# Patient Record
Sex: FEMALE | Race: BLACK OR AFRICAN AMERICAN | NOT HISPANIC OR LATINO
[De-identification: names, ages, dates, MRNs, and addresses within clinical notes are randomized per-mention and may not be internally consistent; named-entity substitution may affect disease eponyms.]

---

## 2018-07-30 PROBLEM — Z00.00 ENCOUNTER FOR PREVENTIVE HEALTH EXAMINATION: Status: ACTIVE | Noted: 2018-07-30

## 2018-08-14 ENCOUNTER — APPOINTMENT (OUTPATIENT)
Dept: ORTHOPEDIC SURGERY | Facility: CLINIC | Age: 68
End: 2018-08-14
Payer: MEDICARE

## 2018-08-14 VITALS — WEIGHT: 190 LBS | BODY MASS INDEX: 27.2 KG/M2 | HEIGHT: 70 IN

## 2018-08-14 PROCEDURE — 99214 OFFICE O/P EST MOD 30 MIN: CPT

## 2018-08-14 PROCEDURE — 73502 X-RAY EXAM HIP UNI 2-3 VIEWS: CPT

## 2019-03-05 ENCOUNTER — APPOINTMENT (OUTPATIENT)
Dept: ORTHOPEDIC SURGERY | Facility: CLINIC | Age: 69
End: 2019-03-05
Payer: MEDICARE

## 2019-03-05 VITALS
WEIGHT: 209 LBS | HEIGHT: 70 IN | SYSTOLIC BLOOD PRESSURE: 133 MMHG | BODY MASS INDEX: 29.92 KG/M2 | HEART RATE: 71 BPM | DIASTOLIC BLOOD PRESSURE: 85 MMHG

## 2019-03-05 DIAGNOSIS — M16.0 BILATERAL PRIMARY OSTEOARTHRITIS OF HIP: ICD-10-CM

## 2019-03-05 DIAGNOSIS — M25.552 PAIN IN LEFT HIP: ICD-10-CM

## 2019-03-05 PROCEDURE — 99215 OFFICE O/P EST HI 40 MIN: CPT

## 2019-03-05 PROCEDURE — 73521 X-RAY EXAM HIPS BI 2 VIEWS: CPT

## 2019-05-13 ENCOUNTER — OUTPATIENT (OUTPATIENT)
Dept: OUTPATIENT SERVICES | Facility: HOSPITAL | Age: 69
LOS: 1 days | End: 2019-05-13
Payer: MEDICARE

## 2019-05-13 VITALS
WEIGHT: 210.98 LBS | OXYGEN SATURATION: 98 % | RESPIRATION RATE: 16 BRPM | SYSTOLIC BLOOD PRESSURE: 112 MMHG | HEIGHT: 70 IN | TEMPERATURE: 99 F | DIASTOLIC BLOOD PRESSURE: 79 MMHG | HEART RATE: 80 BPM

## 2019-05-13 DIAGNOSIS — E03.9 HYPOTHYROIDISM, UNSPECIFIED: ICD-10-CM

## 2019-05-13 DIAGNOSIS — M19.90 UNSPECIFIED OSTEOARTHRITIS, UNSPECIFIED SITE: ICD-10-CM

## 2019-05-13 DIAGNOSIS — Z01.818 ENCOUNTER FOR OTHER PREPROCEDURAL EXAMINATION: ICD-10-CM

## 2019-05-13 DIAGNOSIS — Z29.9 ENCOUNTER FOR PROPHYLACTIC MEASURES, UNSPECIFIED: ICD-10-CM

## 2019-05-13 DIAGNOSIS — I10 ESSENTIAL (PRIMARY) HYPERTENSION: ICD-10-CM

## 2019-05-13 DIAGNOSIS — Z91.040 LATEX ALLERGY STATUS: ICD-10-CM

## 2019-05-13 DIAGNOSIS — M16.12 UNILATERAL PRIMARY OSTEOARTHRITIS, LEFT HIP: ICD-10-CM

## 2019-05-13 LAB
BLD GP AB SCN SERPL QL: POSITIVE — SIGNIFICANT CHANGE UP
HCT VFR BLD CALC: 42.6 % — SIGNIFICANT CHANGE UP (ref 34.5–45)
HGB BLD-MCNC: 13.5 G/DL — SIGNIFICANT CHANGE UP (ref 11.5–15.5)
MCHC RBC-ENTMCNC: 26.8 PG — LOW (ref 27–34)
MCHC RBC-ENTMCNC: 31.7 GM/DL — LOW (ref 32–36)
MCV RBC AUTO: 84.7 FL — SIGNIFICANT CHANGE UP (ref 80–100)
PLATELET # BLD AUTO: 183 K/UL — SIGNIFICANT CHANGE UP (ref 150–400)
RBC # BLD: 5.03 M/UL — SIGNIFICANT CHANGE UP (ref 3.8–5.2)
RBC # FLD: 14.8 % — HIGH (ref 10.3–14.5)
RH IG SCN BLD-IMP: POSITIVE — SIGNIFICANT CHANGE UP
WBC # BLD: 4.71 K/UL — SIGNIFICANT CHANGE UP (ref 3.8–10.5)
WBC # FLD AUTO: 4.71 K/UL — SIGNIFICANT CHANGE UP (ref 3.8–10.5)

## 2019-05-13 PROCEDURE — 87640 STAPH A DNA AMP PROBE: CPT

## 2019-05-13 PROCEDURE — 86850 RBC ANTIBODY SCREEN: CPT

## 2019-05-13 PROCEDURE — 86923 COMPATIBILITY TEST ELECTRIC: CPT

## 2019-05-13 PROCEDURE — 87641 MR-STAPH DNA AMP PROBE: CPT

## 2019-05-13 PROCEDURE — 85027 COMPLETE CBC AUTOMATED: CPT

## 2019-05-13 PROCEDURE — 86900 BLOOD TYPING SEROLOGIC ABO: CPT

## 2019-05-13 PROCEDURE — 86901 BLOOD TYPING SEROLOGIC RH(D): CPT

## 2019-05-13 PROCEDURE — 86905 BLOOD TYPING RBC ANTIGENS: CPT

## 2019-05-13 PROCEDURE — 86880 COOMBS TEST DIRECT: CPT

## 2019-05-13 PROCEDURE — 86870 RBC ANTIBODY IDENTIFICATION: CPT

## 2019-05-13 PROCEDURE — G0463: CPT

## 2019-05-13 RX ORDER — LIDOCAINE HCL 20 MG/ML
0.2 VIAL (ML) INJECTION ONCE
Refills: 0 | Status: DISCONTINUED | OUTPATIENT
Start: 2019-06-03 | End: 2019-06-03

## 2019-05-13 RX ORDER — VANCOMYCIN HCL 1 G
1500 VIAL (EA) INTRAVENOUS ONCE
Refills: 0 | Status: DISCONTINUED | OUTPATIENT
Start: 2019-06-03 | End: 2019-06-05

## 2019-05-13 RX ORDER — CHLORHEXIDINE GLUCONATE 213 G/1000ML
1 SOLUTION TOPICAL ONCE
Refills: 0 | Status: DISCONTINUED | OUTPATIENT
Start: 2019-06-03 | End: 2019-06-03

## 2019-05-13 RX ORDER — SODIUM CHLORIDE 9 MG/ML
3 INJECTION INTRAMUSCULAR; INTRAVENOUS; SUBCUTANEOUS EVERY 8 HOURS
Refills: 0 | Status: DISCONTINUED | OUTPATIENT
Start: 2019-06-03 | End: 2019-06-03

## 2019-05-13 NOTE — H&P PST ADULT - NS MD HP PULSE RADIAL
For information on Fall & Injury Prevention, visit [email or web address]
right normal/left normal

## 2019-05-13 NOTE — H&P PST ADULT - GASTROINTESTINAL DETAILS
no masses palpable/no rebound tenderness/soft/no guarding/normal/bowel sounds normal/no rigidity/no bruit/nontender/no distention/no organomegaly normal/nontender/no distention/soft/no masses palpable/bowel sounds normal/no guarding/no bruit/no rebound tenderness/no rigidity

## 2019-05-13 NOTE — H&P PST ADULT - NSICDXPASTMEDICALHX_GEN_ALL_CORE_FT
PAST MEDICAL HISTORY:  Arthritis     Hallux Rigidus     HLD (Hyperlipidemia)     HTN - Hypertension     Hypothyroidism

## 2019-05-13 NOTE — H&P PST ADULT - NSANTHOSAYNRD_GEN_A_CORE
No. LAVELL screening performed.  STOP BANG Legend: 0-2 = LOW Risk; 3-4 = INTERMEDIATE Risk; 5-8 = HIGH Risk

## 2019-05-13 NOTE — H&P PST ADULT - HISTORY OF PRESENT ILLNESS
70 yo female, PMH HTN, HLD, OA of left hip with pain for many years, pt. presents to [facility] today for scheduled Left anterior Total Hip Replacement on 6/3/19. Pt. denies recent fever, chills, chest pain, SOB, changes in bowel/urinary habits.  Lab work done at PCP's office one week ago - they will be faxing to [facility] today  T&S, MRSA/MSSA done at [facility] 70 yo female, PMH HTN, HLD, OA of left hip with pain for many years, preventing her to stay active, presently using cane to ambulate. Pt. presents to [facility] today for scheduled Left anterior Total Hip Replacement on 6/3/19. Pt. denies recent fever, chills, chest pain, SOB, changes in bowel/urinary habits.  CBC, T&S, MRSA/MSSA done at [facility], BMP done at PCP - wnl, HgA1c 5.9 on 5/4/19

## 2019-05-13 NOTE — H&P PST ADULT - NEGATIVE GENERAL GENITOURINARY SYMPTOMS
no flank pain L/no dysuria/no hematuria/no incontinence/no flank pain R/no bladder infections no hematuria/no incontinence/normal urinary frequency/no bladder infections/no urinary hesitancy/no dysuria

## 2019-05-13 NOTE — H&P PST ADULT - NSICDXPROBLEM_GEN_ALL_CORE_FT
PROBLEM DIAGNOSES  Problem: OA (osteoarthritis)  Assessment and Plan: Left anterior total Hip Replacement  Pre-op education, including Chlorhexidine soap, provided - all questions answered    Problem: Hypothyroid  Assessment and Plan: Continue Levothyroxine as indicated, including am of surgery with sip of water    Problem: Hypertension  Assessment and Plan: Continue BP meds as indicated,    Problem: Prophylactic measure  Assessment and Plan: The Caprini score indicates that this patient is at high risk for a VTE event (score 6 or greater). Surgical patients in this group will benefit from both pharmacologic prophylaxis and intermittent compression devices.  The surgical team will determine the balance between VTE risk and bleeding risk, and other clinical considerations PROBLEM DIAGNOSES  Problem: OA (osteoarthritis)  Assessment and Plan: Left anterior total Hip Replacement  Pre-op education, including Chlorhexidine soap, provided - all questions answered    Problem: Hypothyroid  Assessment and Plan: Continue Levothyroxine as indicated, including am of surgery with sip of water    Problem: Hypertension  Assessment and Plan: Continue BP meds as indicated,    Problem: Latex allergy  Assessment and Plan: OR Booking notified    Problem: Prophylactic measure  Assessment and Plan: The Caprini score indicates that this patient is at high risk for a VTE event (score 6 or greater). Surgical patients in this group will benefit from both pharmacologic prophylaxis and intermittent compression devices.  The surgical team will determine the balance between VTE risk and bleeding risk, and other clinical considerations

## 2019-05-13 NOTE — H&P PST ADULT - MUSCULOSKELETAL COMMENTS
left foot great toe, pain with rom left and right great toe with with swelling and tenderness in MTP joint, pain with rom

## 2019-05-13 NOTE — H&P PST ADULT - ACTIVITY
walking, able to climb a flight of stairs walking, able to climb a flight of stairs, can walk Can walk 4 blocks with cane, one flight of stairs

## 2019-05-13 NOTE — H&P PST ADULT - NEGATIVE GASTROINTESTINAL SYMPTOMS
no abdominal pain/no diarrhea/no nausea/no vomiting/no constipation no change in bowel habits/no abdominal pain/no nausea/no vomiting

## 2019-05-13 NOTE — H&P PST ADULT - NSICDXPASTSURGICALHX_GEN_ALL_CORE_FT
PAST SURGICAL HISTORY:  Hysterectomy 1999    S/P bunionectomy 2015    Thyroidectomy secondary to thyroid nodules, 1/2010

## 2019-05-13 NOTE — H&P PST ADULT - RS GEN PE MLT RESP DETAILS PC
normal/no chest wall tenderness/no intercostal retractions/no rales/no wheezes/clear to auscultation bilaterally/no rhonchi/respirations non-labored/good air movement/breath sounds equal clear to auscultation bilaterally/no wheezes/respirations non-labored/normal/no rales/no rhonchi

## 2019-05-13 NOTE — H&P PST ADULT - LYMPHATIC
posterior cervical R/anterior cervical R/posterior cervical L/anterior cervical L anterior cervical R/anterior cervical L

## 2019-05-13 NOTE — H&P PST ADULT - NEGATIVE ENMT SYMPTOMS
no throat pain/no hearing difficulty/no ear pain/no nasal discharge/no vertigo/no sinus symptoms/no nasal congestion/no tinnitus

## 2019-05-13 NOTE — H&P PST ADULT - ASSESSMENT
CAPRINI SCORE [CLOT]    AGE RELATED RISK FACTORS                                                       MOBILITY RELATED FACTORS  [ ] Age 41-60 years                                            (1 Point)                  [ ] Bed rest                                                        (1 Point)  [x ] Age: 61-74 years                                           (2 Points)                 [ ] Plaster cast                                                   (2 Points)  [ ] Age= 75 years                                              (3 Points)                 [ ] Bed bound for more than 72 hours                 (2 Points)    DISEASE RELATED RISK FACTORS                                               GENDER SPECIFIC FACTORS  [ ] Edema in the lower extremities                       (1 Point)                  [ ] Pregnancy                                                     (1 Point)  [ ] Varicose veins                                               (1 Point)                  [ ] Post-partum < 6 weeks                                   (1 Point)             [x ] BMI > 25 Kg/m2                                            (1 Point)                  [ ] Hormonal therapy  or oral contraception          (1 Point)                 [ ] Sepsis (in the previous month)                        (1 Point)                  [ ] History of pregnancy complications                 (1 point)  [ ] Pneumonia or serious lung disease                                               [ ] Unexplained or recurrent                     (1 Point)           (in the previous month)                               (1 Point)  [ ] Abnormal pulmonary function test                     (1 Point)                 SURGERY RELATED RISK FACTORS  [ ] Acute myocardial infarction                              (1 Point)                 [ ]  Section                                             (1 Point)  [ ] Congestive heart failure (in the previous month)  (1 Point)               [ ] Minor surgery                                                  (1 Point)   [ ] Inflammatory bowel disease                             (1 Point)                 [ ] Arthroscopic surgery                                        (2 Points)  [ ] Central venous access                                      (2 Points)                [ ] General surgery lasting more than 45 minutes   (2 Points)       [ ] Stroke (in the previous month)                          (5 Points)               [x ] Elective arthroplasty                                         (5 Points)                                                                                                                                               HEMATOLOGY RELATED FACTORS                                                 TRAUMA RELATED RISK FACTORS  [ ] Prior episodes of VTE                                     (3 Points)                [ ] Fracture of the hip, pelvis, or leg                       (5 Points)  [ ] Positive family history for VTE                         (3 Points)                 [ ] Acute spinal cord injury (in the previous month)  (5 Points)  [ ] Prothrombin 79587 A                                     (3 Points)                 [ ] Paralysis  (less than 1 month)                             (5 Points)  [ ] Factor V Leiden                                             (3 Points)                  [ ] Multiple Trauma within 1 month                        (5 Points)  [ ] Lupus anticoagulants                                     (3 Points)                                                           [ ] Anticardiolipin antibodies                               (3 Points)                                                       [ ] High homocysteine in the blood                      (3 Points)                                             [ ] Other congenital or acquired thrombophilia      (3 Points)                                                [ ] Heparin induced thrombocytopenia                  (3 Points)                                          Total Score [ 8         ]

## 2019-05-13 NOTE — H&P PST ADULT - NEGATIVE RESPIRATORY AND THORAX SYMPTOMS
no dyspnea/no wheezing/no pleuritic chest pain/no cough/no hemoptysis no wheezing/no cough/no dyspnea

## 2019-05-13 NOTE — H&P PST ADULT - NEGATIVE GENERAL SYMPTOMS
no fever/no chills/no anorexia/no weight gain/no polyphagia/no polyuria/no fatigue/no polydipsia/no sweating/no malaise/no weight loss no fever/no chills

## 2019-05-14 LAB
DAT POLY-SP REAG RBC QL: NEGATIVE — SIGNIFICANT CHANGE UP
MRSA PCR RESULT.: SIGNIFICANT CHANGE UP
S AUREUS DNA NOSE QL NAA+PROBE: SIGNIFICANT CHANGE UP

## 2019-05-14 PROCEDURE — 86077 PHYS BLOOD BANK SERV XMATCH: CPT

## 2019-05-16 LAB — ANTIBODY ID 1_1: SIGNIFICANT CHANGE UP

## 2019-05-22 PROCEDURE — 86077 PHYS BLOOD BANK SERV XMATCH: CPT

## 2019-06-02 ENCOUNTER — TRANSCRIPTION ENCOUNTER (OUTPATIENT)
Age: 69
End: 2019-06-02

## 2019-06-03 ENCOUNTER — INPATIENT (INPATIENT)
Facility: HOSPITAL | Age: 69
LOS: 1 days | Discharge: NURSING, REHAB OR LONG-TERM CARE | DRG: 470 | End: 2019-06-05
Attending: ORTHOPAEDIC SURGERY | Admitting: ORTHOPAEDIC SURGERY
Payer: MEDICARE

## 2019-06-03 ENCOUNTER — APPOINTMENT (OUTPATIENT)
Dept: ORTHOPEDIC SURGERY | Facility: HOSPITAL | Age: 69
End: 2019-06-03

## 2019-06-03 VITALS
OXYGEN SATURATION: 97 % | HEART RATE: 80 BPM | SYSTOLIC BLOOD PRESSURE: 117 MMHG | DIASTOLIC BLOOD PRESSURE: 77 MMHG | HEIGHT: 70 IN | WEIGHT: 210.98 LBS | TEMPERATURE: 98 F | RESPIRATION RATE: 18 BRPM

## 2019-06-03 DIAGNOSIS — M16.12 UNILATERAL PRIMARY OSTEOARTHRITIS, LEFT HIP: ICD-10-CM

## 2019-06-03 LAB
BLD GP AB SCN SERPL QL: POSITIVE — SIGNIFICANT CHANGE UP
GLUCOSE BLDC GLUCOMTR-MCNC: 106 MG/DL — HIGH (ref 70–99)
RH IG SCN BLD-IMP: POSITIVE — SIGNIFICANT CHANGE UP

## 2019-06-03 PROCEDURE — 72170 X-RAY EXAM OF PELVIS: CPT | Mod: 26

## 2019-06-03 PROCEDURE — 27130 TOTAL HIP ARTHROPLASTY: CPT | Mod: LT

## 2019-06-03 RX ORDER — SODIUM CHLORIDE 9 MG/ML
500 INJECTION INTRAMUSCULAR; INTRAVENOUS; SUBCUTANEOUS ONCE
Refills: 0 | Status: COMPLETED | OUTPATIENT
Start: 2019-06-03 | End: 2019-06-03

## 2019-06-03 RX ORDER — TRAMADOL HYDROCHLORIDE 50 MG/1
50 TABLET ORAL ONCE
Refills: 0 | Status: DISCONTINUED | OUTPATIENT
Start: 2019-06-03 | End: 2019-06-03

## 2019-06-03 RX ORDER — HYDROMORPHONE HYDROCHLORIDE 2 MG/ML
0.5 INJECTION INTRAMUSCULAR; INTRAVENOUS; SUBCUTANEOUS
Refills: 0 | Status: DISCONTINUED | OUTPATIENT
Start: 2019-06-03 | End: 2019-06-03

## 2019-06-03 RX ORDER — KETOROLAC TROMETHAMINE 30 MG/ML
30 SYRINGE (ML) INJECTION EVERY 8 HOURS
Refills: 0 | Status: DISCONTINUED | OUTPATIENT
Start: 2019-06-03 | End: 2019-06-05

## 2019-06-03 RX ORDER — ACETAMINOPHEN 500 MG
975 TABLET ORAL ONCE
Refills: 0 | Status: COMPLETED | OUTPATIENT
Start: 2019-06-03 | End: 2019-06-03

## 2019-06-03 RX ORDER — OXYCODONE HYDROCHLORIDE 5 MG/1
10 TABLET ORAL EVERY 4 HOURS
Refills: 0 | Status: DISCONTINUED | OUTPATIENT
Start: 2019-06-03 | End: 2019-06-05

## 2019-06-03 RX ORDER — HYDROCHLOROTHIAZIDE 25 MG
12.5 TABLET ORAL DAILY
Refills: 0 | Status: DISCONTINUED | OUTPATIENT
Start: 2019-06-03 | End: 2019-06-03

## 2019-06-03 RX ORDER — TRAMADOL HYDROCHLORIDE 50 MG/1
50 TABLET ORAL EVERY 8 HOURS
Refills: 0 | Status: DISCONTINUED | OUTPATIENT
Start: 2019-06-03 | End: 2019-06-03

## 2019-06-03 RX ORDER — CELECOXIB 200 MG/1
200 CAPSULE ORAL EVERY 12 HOURS
Refills: 0 | Status: DISCONTINUED | OUTPATIENT
Start: 2019-06-05 | End: 2019-06-05

## 2019-06-03 RX ORDER — PANTOPRAZOLE SODIUM 20 MG/1
40 TABLET, DELAYED RELEASE ORAL
Refills: 0 | Status: DISCONTINUED | OUTPATIENT
Start: 2019-06-03 | End: 2019-06-05

## 2019-06-03 RX ORDER — HYDROMORPHONE HYDROCHLORIDE 2 MG/ML
0.5 INJECTION INTRAMUSCULAR; INTRAVENOUS; SUBCUTANEOUS EVERY 4 HOURS
Refills: 0 | Status: DISCONTINUED | OUTPATIENT
Start: 2019-06-03 | End: 2019-06-05

## 2019-06-03 RX ORDER — FENTANYL CITRATE 50 UG/ML
25 INJECTION INTRAVENOUS
Refills: 0 | Status: DISCONTINUED | OUTPATIENT
Start: 2019-06-03 | End: 2019-06-03

## 2019-06-03 RX ORDER — POLYETHYLENE GLYCOL 3350 17 G/17G
17 POWDER, FOR SOLUTION ORAL DAILY
Refills: 0 | Status: DISCONTINUED | OUTPATIENT
Start: 2019-06-03 | End: 2019-06-05

## 2019-06-03 RX ORDER — ACETAMINOPHEN 500 MG
1000 TABLET ORAL ONCE
Refills: 0 | Status: COMPLETED | OUTPATIENT
Start: 2019-06-04 | End: 2019-06-04

## 2019-06-03 RX ORDER — ASPIRIN/CALCIUM CARB/MAGNESIUM 324 MG
325 TABLET ORAL
Refills: 0 | Status: DISCONTINUED | OUTPATIENT
Start: 2019-06-03 | End: 2019-06-05

## 2019-06-03 RX ORDER — SENNA PLUS 8.6 MG/1
2 TABLET ORAL AT BEDTIME
Refills: 0 | Status: DISCONTINUED | OUTPATIENT
Start: 2019-06-03 | End: 2019-06-05

## 2019-06-03 RX ORDER — ONDANSETRON 8 MG/1
4 TABLET, FILM COATED ORAL EVERY 6 HOURS
Refills: 0 | Status: DISCONTINUED | OUTPATIENT
Start: 2019-06-03 | End: 2019-06-05

## 2019-06-03 RX ORDER — SIMVASTATIN 20 MG/1
1 TABLET, FILM COATED ORAL
Qty: 0 | Refills: 0 | DISCHARGE

## 2019-06-03 RX ORDER — LOSARTAN POTASSIUM 100 MG/1
25 TABLET, FILM COATED ORAL DAILY
Refills: 0 | Status: DISCONTINUED | OUTPATIENT
Start: 2019-06-03 | End: 2019-06-05

## 2019-06-03 RX ORDER — OXYCODONE HYDROCHLORIDE 5 MG/1
5 TABLET ORAL EVERY 4 HOURS
Refills: 0 | Status: DISCONTINUED | OUTPATIENT
Start: 2019-06-03 | End: 2019-06-05

## 2019-06-03 RX ORDER — ACETAMINOPHEN 500 MG
1000 TABLET ORAL ONCE
Refills: 0 | Status: COMPLETED | OUTPATIENT
Start: 2019-06-03 | End: 2019-06-03

## 2019-06-03 RX ORDER — ACETAMINOPHEN 500 MG
1000 TABLET ORAL ONCE
Refills: 0 | Status: DISCONTINUED | OUTPATIENT
Start: 2019-06-03 | End: 2019-06-05

## 2019-06-03 RX ORDER — LEVOTHYROXINE SODIUM 125 MCG
100 TABLET ORAL DAILY
Refills: 0 | Status: DISCONTINUED | OUTPATIENT
Start: 2019-06-03 | End: 2019-06-05

## 2019-06-03 RX ORDER — HYDROCHLOROTHIAZIDE 25 MG
12.5 TABLET ORAL DAILY
Refills: 0 | Status: DISCONTINUED | OUTPATIENT
Start: 2019-06-03 | End: 2019-06-05

## 2019-06-03 RX ORDER — VANCOMYCIN HCL 1 G
1500 VIAL (EA) INTRAVENOUS ONCE
Refills: 0 | Status: COMPLETED | OUTPATIENT
Start: 2019-06-03 | End: 2019-06-03

## 2019-06-03 RX ORDER — SODIUM CHLORIDE 9 MG/ML
500 INJECTION INTRAMUSCULAR; INTRAVENOUS; SUBCUTANEOUS ONCE
Refills: 0 | Status: COMPLETED | OUTPATIENT
Start: 2019-06-04 | End: 2019-06-04

## 2019-06-03 RX ORDER — LEVOTHYROXINE SODIUM 125 MCG
1 TABLET ORAL
Qty: 0 | Refills: 0 | DISCHARGE

## 2019-06-03 RX ORDER — PANTOPRAZOLE SODIUM 20 MG/1
40 TABLET, DELAYED RELEASE ORAL ONCE
Refills: 0 | Status: COMPLETED | OUTPATIENT
Start: 2019-06-03 | End: 2019-06-03

## 2019-06-03 RX ORDER — OLMESARTAN MEDOXOMIL-HYDROCHLOROTHIAZIDE 25; 40 MG/1; MG/1
1 TABLET, FILM COATED ORAL
Qty: 0 | Refills: 0 | DISCHARGE

## 2019-06-03 RX ORDER — ONDANSETRON 8 MG/1
4 TABLET, FILM COATED ORAL ONCE
Refills: 0 | Status: DISCONTINUED | OUTPATIENT
Start: 2019-06-03 | End: 2019-06-03

## 2019-06-03 RX ORDER — GABAPENTIN 400 MG/1
300 CAPSULE ORAL ONCE
Refills: 0 | Status: COMPLETED | OUTPATIENT
Start: 2019-06-03 | End: 2019-06-03

## 2019-06-03 RX ORDER — TRAMADOL HYDROCHLORIDE 50 MG/1
50 TABLET ORAL EVERY 8 HOURS
Refills: 0 | Status: DISCONTINUED | OUTPATIENT
Start: 2019-06-03 | End: 2019-06-05

## 2019-06-03 RX ORDER — SIMVASTATIN 20 MG/1
40 TABLET, FILM COATED ORAL AT BEDTIME
Refills: 0 | Status: DISCONTINUED | OUTPATIENT
Start: 2019-06-03 | End: 2019-06-05

## 2019-06-03 RX ORDER — DOCUSATE SODIUM 100 MG
100 CAPSULE ORAL THREE TIMES A DAY
Refills: 0 | Status: DISCONTINUED | OUTPATIENT
Start: 2019-06-03 | End: 2019-06-05

## 2019-06-03 RX ORDER — MAGNESIUM HYDROXIDE 400 MG/1
30 TABLET, CHEWABLE ORAL ONCE
Refills: 0 | Status: DISCONTINUED | OUTPATIENT
Start: 2019-06-03 | End: 2019-06-05

## 2019-06-03 RX ORDER — ACETAMINOPHEN 500 MG
650 TABLET ORAL EVERY 6 HOURS
Refills: 0 | Status: DISCONTINUED | OUTPATIENT
Start: 2019-06-03 | End: 2019-06-05

## 2019-06-03 RX ORDER — SODIUM CHLORIDE 9 MG/ML
1000 INJECTION, SOLUTION INTRAVENOUS
Refills: 0 | Status: DISCONTINUED | OUTPATIENT
Start: 2019-06-03 | End: 2019-06-05

## 2019-06-03 RX ADMIN — TRAMADOL HYDROCHLORIDE 50 MILLIGRAM(S): 50 TABLET ORAL at 18:35

## 2019-06-03 RX ADMIN — Medication 975 MILLIGRAM(S): at 08:14

## 2019-06-03 RX ADMIN — OXYCODONE HYDROCHLORIDE 5 MILLIGRAM(S): 5 TABLET ORAL at 16:25

## 2019-06-03 RX ADMIN — Medication 30 MILLIGRAM(S): at 14:30

## 2019-06-03 RX ADMIN — Medication 30 MILLIGRAM(S): at 14:40

## 2019-06-03 RX ADMIN — Medication 650 MILLIGRAM(S): at 17:35

## 2019-06-03 RX ADMIN — Medication 300 MILLIGRAM(S): at 22:46

## 2019-06-03 RX ADMIN — SODIUM CHLORIDE 125 MILLILITER(S): 9 INJECTION, SOLUTION INTRAVENOUS at 14:02

## 2019-06-03 RX ADMIN — TRAMADOL HYDROCHLORIDE 50 MILLIGRAM(S): 50 TABLET ORAL at 18:07

## 2019-06-03 RX ADMIN — SODIUM CHLORIDE 1000 MILLILITER(S): 9 INJECTION INTRAMUSCULAR; INTRAVENOUS; SUBCUTANEOUS at 18:08

## 2019-06-03 RX ADMIN — SODIUM CHLORIDE 1000 MILLILITER(S): 9 INJECTION INTRAMUSCULAR; INTRAVENOUS; SUBCUTANEOUS at 14:02

## 2019-06-03 RX ADMIN — GABAPENTIN 300 MILLIGRAM(S): 400 CAPSULE ORAL at 08:14

## 2019-06-03 RX ADMIN — OXYCODONE HYDROCHLORIDE 10 MILLIGRAM(S): 5 TABLET ORAL at 21:30

## 2019-06-03 RX ADMIN — LOSARTAN POTASSIUM 25 MILLIGRAM(S): 100 TABLET, FILM COATED ORAL at 21:00

## 2019-06-03 RX ADMIN — Medication 12.5 MILLIGRAM(S): at 21:00

## 2019-06-03 RX ADMIN — Medication 100 MILLIGRAM(S): at 21:01

## 2019-06-03 RX ADMIN — Medication 325 MILLIGRAM(S): at 17:35

## 2019-06-03 RX ADMIN — Medication 30 MILLIGRAM(S): at 21:01

## 2019-06-03 RX ADMIN — Medication 30 MILLIGRAM(S): at 21:30

## 2019-06-03 RX ADMIN — TRAMADOL HYDROCHLORIDE 50 MILLIGRAM(S): 50 TABLET ORAL at 10:10

## 2019-06-03 RX ADMIN — OXYCODONE HYDROCHLORIDE 5 MILLIGRAM(S): 5 TABLET ORAL at 17:00

## 2019-06-03 RX ADMIN — OXYCODONE HYDROCHLORIDE 10 MILLIGRAM(S): 5 TABLET ORAL at 20:58

## 2019-06-03 RX ADMIN — PANTOPRAZOLE SODIUM 40 MILLIGRAM(S): 20 TABLET, DELAYED RELEASE ORAL at 08:14

## 2019-06-03 RX ADMIN — SODIUM CHLORIDE 3 MILLILITER(S): 9 INJECTION INTRAMUSCULAR; INTRAVENOUS; SUBCUTANEOUS at 08:15

## 2019-06-03 RX ADMIN — SIMVASTATIN 40 MILLIGRAM(S): 20 TABLET, FILM COATED ORAL at 21:00

## 2019-06-03 NOTE — PRE-ANESTHESIA EVALUATION ADULT - NSANTHLABRESULTSFT_GEN_ALL_CORE
Patient has atypical antibodies and no units available without Blood Bank Director release.  After a discussion with the attending surgeon, we decided to proceed given the very low likelihood to need blood.

## 2019-06-03 NOTE — PRE-OP CHECKLIST - ADVANCE DIRECTIVE ADDRESSED/READDRESSED
"Patient Care Team:  Priyank Wright MD as PCP - General (Family Medicine)    Chief Complaint: \" Depression and anxiety for years\"    Subjective         History of Present Illness: Patient is a 36-year-old  female  since 19 years, mother of 3 living children 1716 and 12-year-old, who have not been in her custody since one year, her  is currently in [place], she has lived here and there and has no income.  She was admitted on 2018 after she presented to the emergency room with complaints that she is losing her mind, has been worse since her son  a month ago she wants to die, feels she is a danger to herself if she doesn't stop.    I saw the patient on 2018 when she listed his chief complaint as described above.  She says she has been depressed and anxious for years, she doesn't know why, she said anything and everything aggravates her, she cannot handle it anymore, she has been worse since her 18-year-old son  in a car wreck a month ago, she feels like she is losing her mind, she is hearing things, describes it as \"a bunch of things but would not give any specifics.  She says she felt like she did not want to live anymore, which is why she came in the hospital.  She denies she was having suicidal thoughts and denies any previous history of suicidal attempts.,  Patient admits that she has mood swings but cannot describe any , she admits that she has flashbacks sometimes, but does not give any other information , answers \"I don't know\",  Substance Abuse History: She denies any    Legal History: She denies any.  Past psychiatric history   Patient says she has been in [facility] years ago she denies any history of psychiatric hospitalizations, she says she has attended [facility] in [facility], has been prescribed Vistaril, she says she is currently attending [facility]          Maddison says she does not have a family physician , she " "complains of dizziness often .she has had 3 C-sections , overly and cyst removed from her right ovary , LEEP surgery she is allergic to Asmanex , and she is currently on no prescription medications   Past Medical History:   Diagnosis Date   • Anxiety    • Bilateral ovarian cysts    • Chronic pain disorder     generalized   • Depression    • History of substance abuse    • Suicidal thoughts      Past Surgical History:   Procedure Laterality Date   •  SECTION      x2   • DILATATION AND CURETTAGE     • OVARIAN CYST REMOVAL     • TUBAL ABDOMINAL LIGATION  2006     Family History   Problem Relation Age of Onset   • ADD / ADHD Neg Hx    • Alcohol abuse Neg Hx    • Bipolar disorder Neg Hx    • Dementia Neg Hx    • Anxiety disorder Neg Hx    • Depression Neg Hx    • Drug abuse Neg Hx    • OCD Neg Hx    • Paranoid behavior Neg Hx    • Schizophrenia Neg Hx    • Seizures Neg Hx    • Self-Injurious Behavior  Neg Hx    • Suicide Attempts Neg Hx      Social History   Substance Use Topics   • Smoking status: Current Every Day Smoker     Packs/day: 1.00     Years: 20.00     Types: Cigarettes   • Smokeless tobacco: Never Used   • Alcohol use No     No prescriptions prior to admission.     Allergies:  Asmanex 120 metered doses [mometasone furoate]  Family. history patient says her mother has bipolar disorder , she sees a psychiatrist in [place] .  Personal history she has 11th grade education , she last worked in 2017 when she was working at [employer] , she had worked there one month   Mental Status Exam:    Hygiene:   good  Cooperation:  Cooperative  Eye Contact:  Good  Psychomotor Behavior:  Appropriate  Affect:  Restricted  Speech:  Normal  Thought Progress:  Goal directed  Thought Content:  Mood congurent  Suicidal:  None  Homicidal:  None  Hallucinations: Patient says she hears voices a lot  but cannot describe the contents, says \"I don't know\"  Delusion:  Paranoid and Other Patient says she cannot explain how she " is paranoid  Memory:  Intact  Orientation:  Person, Place, Time and Situation  Reliability:  fair  Insight:  Fair  Judgement:  Fair  Level of intellect: Average     Physical Exam:      General Appearance:    Alert, cooperative, in no acute distress   Head:    Normocephalic, without obvious abnormality, atraumatic   Eyes:            Lids and lashes normal, conjunctivae and sclerae normal, no   icterus, no pallor, corneas clear, PERRLA   Ears:    Ears appear intact with no abnormalities noted   Throat:   No oral lesions, no thrush, oral mucosa moist   Neck:   No adenopathy, supple, trachea midline, no thyromegaly, no     carotid bruit, no JVD   Back:     No kyphosis present, no scoliosis present, no skin lesions,       erythema or scars, no tenderness to percussion or                   palpation,   range of motion normal   Lungs:     Clear to auscultation,respirations regular, even and                   unlabored    Heart:    Regular rhythm and normal rate, normal S1 and S2, no            murmur, no gallop, no rub, no click   Breast Exam:    Deferred   Abdomen:     Normal bowel sounds, no masses, no organomegaly, soft        non-tender, non-distended, no guarding, no rebound                 tenderness   Genitalia:    Deferred   Extremities:   Moves all extremities well, no edema, no cyanosis, no              redness   Pulses:   Pulses palpable and equal bilaterally   Skin:   No bleeding, bruising or rash   Lymph nodes:   No palpable adenopathy   Neurologic:   Cranial nerves 2 - 12 grossly intact, sensation intact, DTR        present and equal bilaterally       Objective     Vital Signs    Temp:  [97.2 °F (36.2 °C)-98.4 °F (36.9 °C)] 97.8 °F (36.6 °C)  Heart Rate:  [62-72] 68  Resp:  [16-18] 18  BP: ()/(50-82) 92/51    Lab Results:   Lab Results (last 24 hours)     ** No results found for the last 24 hours. **           Labs:     Lab Results (last 24 hours)     ** No results found for the last 24 hours. **                           Lab Results   Component Value Date    WBC 7.09 05/07/2018    HGB 13.3 05/07/2018    HCT 39.7 05/07/2018    MCV 95.9 (H) 05/07/2018     05/07/2018     Lab Results   Component Value Date    GLUCOSE 115 (H) 05/07/2018    BUN 9 05/07/2018    CREATININE 0.95 05/07/2018    EGFRIFNONA 67 05/07/2018    BCR 9.5 05/07/2018    CO2 26.2 05/07/2018    CALCIUM 9.1 05/07/2018    ALBUMIN 4.10 05/07/2018    LABIL2 1.5 05/07/2018    AST 15 05/07/2018    ALT 16 05/07/2018     Pain Management Panel     Pain Management Panel Latest Ref Rng & Units 5/7/2018 10/20/2017    AMPHETAMINES SCREEN, URINE Negative Negative Negative    BARBITURATES SCREEN Negative Negative Negative    BENZODIAZEPINE SCREEN, URINE Negative Negative Negative    BUPRENORPHINE Negative Negative Negative    COCAINE SCREEN, URINE Negative Negative Negative    METHADONE SCREEN, URINE Negative Negative Negative          Assessment/Diagnosis:Major Depression recurrent with psychotic features     Plan of Care: Patient is admitted, placed on special precautions level III, I will start her on Zoloft 50 mg daily, we will do daily psychiatric evaluation for assessment of mental status, Josiah medication and counseling as appropriate, further treatment but hospital course        Results Review: CMP has shown slightly elevated glucose at 1:15 and low anion gap at 1.8 otherwise within normal limits    CBC is essentially within normal limits.    urinalysis showed large amount of blood 1+ leukocyte esterase 1+ bacteria .patient said that she has been on her periods since yesterday urine culture report is not available yet .  Pregnancy test is negative .  Urine drug screen is negative  EKG has shown normal sinus rhythm   Assessment/Plan     Active Problems:    Depression with suicidal ideation        Treatment Plan discussed with: Patient      I have reviewed and approved the behavioral health treatment plans and problem list. Yes    Lady Corrales  MD  05/08/18  11:39 AM         done

## 2019-06-03 NOTE — PRE-ANESTHESIA EVALUATION ADULT - NSANTHPMHFT_GEN_ALL_CORE
70 yo female, PMH HTN, HLD, OA of left hip with pain for many years, preventing her to stay active, presently using cane to ambulate. Pt. presents to [facility] today for scheduled Left anterior Total Hip Replacement on 6/3/19. Pt. denies recent fever, chills, chest pain, SOB, changes in bowel/urinary habits.

## 2019-06-03 NOTE — PHYSICAL THERAPY INITIAL EVALUATION ADULT - LIGHT TOUCH SENSATION, LLE, REHAB EVAL
GEN: well appearing, nonotixc  HEAD: NCAT  EYES: clear  ENT: mucus membranes are moist   CV: normal rate, regular rhythm 2+ distlal pulses  LUNGS: lungs clear to auscultation bilaterally, equal breath sounds bilaterally   ABD: soft, nontender, mild cough imuplse on R. Very small cough impulse on R without tenderness, no large hernia, no palpable hernia, L-no hernia, no cough impulse  : uncircumcised, no distchare, no edema, mild L-spermatic cord tenderness reproducing pain of complaint  SKIN: no rash or changes, old well-healing surgical scars from inguinal hernia, midline laparotomy  NEURO: normal mentation within normal limits

## 2019-06-03 NOTE — PHYSICAL THERAPY INITIAL EVALUATION ADULT - MANUAL MUSCLE TESTING RESULTS, REHAB EVAL
grossly assessed due to/BUE and RLE grossly at least 3+/5, L hip flexion grossly 3-/5, rest of LLE grossly at least 3/5

## 2019-06-03 NOTE — PHYSICAL THERAPY INITIAL EVALUATION ADULT - TRANSFER TRAINING, PT EVAL
GOAL: Pt will transfer sit to/from stand with least restrictive device as appropriate independently within 2 weeks

## 2019-06-03 NOTE — PHYSICAL THERAPY INITIAL EVALUATION ADULT - GAIT TRAINING, PT EVAL
GOAL: Pt will ambulate 200 feet with least restrictive device as appropriate independently within 2 weeks

## 2019-06-03 NOTE — PHYSICAL THERAPY INITIAL EVALUATION ADULT - PERTINENT HX OF CURRENT PROBLEM, REHAB EVAL
70 y/o F with PMH of HTN, HLD, OA of L hip with pain for many years, preventing her to stay active, presently using cane to ambulate. Pt now presents s/p L THR by anterior approach.

## 2019-06-03 NOTE — PHYSICAL THERAPY INITIAL EVALUATION ADULT - GENERAL OBSERVATIONS, REHAB EVAL
Pt amie 45 min eval well. Pt s/p L anterior THR, WBAT. Rec'd in bed, peripheral IV line, premedicated, NAD. Pt agreed to session. PT reviewed hip precautions.

## 2019-06-03 NOTE — PRE-ANESTHESIA EVALUATION ADULT - NSANTHADDINFOFT_GEN_ALL_CORE
Patient ID'd, chart & Hx reviewed, Pt seen & examined.  Plan for spinal anesthesia w/ GA BU w/ routine monitors, 1 x PIV, PACU post-op discussed with the patient; risks/benefits including death, CVA & MI explained.  I answered all questions and presented other anesthetic options.  The patient provided informed consent and expressed a willingness to proceed.

## 2019-06-03 NOTE — CHART NOTE - NSCHARTNOTEFT_GEN_A_CORE
Patient seen in RR with sister at bedside. Resting comfortably, without complaints.  Denies Chest Pain, SOB, N/V.    T(C): 36.4 (06-03-19 @ 15:00), Max: 36.5 (06-03-19 @ 08:16)  HR: 63 (06-03-19 @ 15:00) (61 - 80)  BP: 106/63 (06-03-19 @ 15:00) (92/58 - 124/75)  RR: 17 (06-03-19 @ 15:00) (14 - 18)  SpO2: 97% (06-03-19 @ 15:00) (97% - 100%)  Wt(kg): --    Exam:  Alert and [place], No Acute Distress  Card: +S1/S2, RRR  Pulm: CTAB  Laterality: L hip aquacel c/d/i  Calves soft, non-tender bilaterally  +PF/DF/EHL/FHL  SILT  + DP pulse    Xray: Post-op xray in chart; prosthesis in good alignment             A/P: Patient is a 69y Female S/p L HIRAL. VSS. NAD  -PT/OT-WBAT LLE With Anterior Hip Precautions  -IS encouraged  -DVT PPx- Aspirin 325 mg BID  -Pain Control PRN  -OOB to chair  -FU AM Labs    Aggie Elias PA-C  Team Pager #[phone]

## 2019-06-04 ENCOUNTER — TRANSCRIPTION ENCOUNTER (OUTPATIENT)
Age: 69
End: 2019-06-04

## 2019-06-04 LAB
ANION GAP SERPL CALC-SCNC: 12 MMOL/L — SIGNIFICANT CHANGE UP (ref 5–17)
BUN SERPL-MCNC: 12 MG/DL — SIGNIFICANT CHANGE UP (ref 7–23)
CALCIUM SERPL-MCNC: 8.2 MG/DL — LOW (ref 8.4–10.5)
CHLORIDE SERPL-SCNC: 100 MMOL/L — SIGNIFICANT CHANGE UP (ref 96–108)
CO2 SERPL-SCNC: 29 MMOL/L — SIGNIFICANT CHANGE UP (ref 22–31)
CREAT SERPL-MCNC: 0.97 MG/DL — SIGNIFICANT CHANGE UP (ref 0.5–1.3)
GLUCOSE SERPL-MCNC: 124 MG/DL — HIGH (ref 70–99)
HCT VFR BLD CALC: 34.9 % — SIGNIFICANT CHANGE UP (ref 34.5–45)
HGB BLD-MCNC: 11.1 G/DL — LOW (ref 11.5–15.5)
MCHC RBC-ENTMCNC: 26.9 PG — LOW (ref 27–34)
MCHC RBC-ENTMCNC: 31.8 GM/DL — LOW (ref 32–36)
MCV RBC AUTO: 84.5 FL — SIGNIFICANT CHANGE UP (ref 80–100)
PLATELET # BLD AUTO: 158 K/UL — SIGNIFICANT CHANGE UP (ref 150–400)
POTASSIUM SERPL-MCNC: 3.8 MMOL/L — SIGNIFICANT CHANGE UP (ref 3.5–5.3)
POTASSIUM SERPL-SCNC: 3.8 MMOL/L — SIGNIFICANT CHANGE UP (ref 3.5–5.3)
RBC # BLD: 4.13 M/UL — SIGNIFICANT CHANGE UP (ref 3.8–5.2)
RBC # FLD: 15.1 % — HIGH (ref 10.3–14.5)
SODIUM SERPL-SCNC: 141 MMOL/L — SIGNIFICANT CHANGE UP (ref 135–145)
WBC # BLD: 7.19 K/UL — SIGNIFICANT CHANGE UP (ref 3.8–10.5)
WBC # FLD AUTO: 7.19 K/UL — SIGNIFICANT CHANGE UP (ref 3.8–10.5)

## 2019-06-04 RX ORDER — ACETAMINOPHEN 500 MG
2 TABLET ORAL
Qty: 0 | Refills: 0 | DISCHARGE

## 2019-06-04 RX ORDER — MELOXICAM 15 MG/1
1 TABLET ORAL
Qty: 0 | Refills: 0 | DISCHARGE

## 2019-06-04 RX ORDER — DOCUSATE SODIUM 100 MG
1 CAPSULE ORAL
Qty: 0 | Refills: 0 | DISCHARGE
Start: 2019-06-04

## 2019-06-04 RX ORDER — PANTOPRAZOLE SODIUM 20 MG/1
1 TABLET, DELAYED RELEASE ORAL
Qty: 30 | Refills: 0
Start: 2019-06-04 | End: 2019-07-03

## 2019-06-04 RX ORDER — ACETAMINOPHEN 500 MG
3 TABLET ORAL
Qty: 0 | Refills: 0 | DISCHARGE
Start: 2019-06-04

## 2019-06-04 RX ORDER — ASPIRIN/CALCIUM CARB/MAGNESIUM 324 MG
1 TABLET ORAL
Qty: 84 | Refills: 0
Start: 2019-06-04 | End: 2019-07-15

## 2019-06-04 RX ORDER — TRAMADOL HYDROCHLORIDE 50 MG/1
1 TABLET ORAL
Qty: 21 | Refills: 0
Start: 2019-06-04 | End: 2019-06-10

## 2019-06-04 RX ORDER — POLYETHYLENE GLYCOL 3350 17 G/17G
17 POWDER, FOR SOLUTION ORAL
Qty: 0 | Refills: 0 | DISCHARGE
Start: 2019-06-04

## 2019-06-04 RX ORDER — OXYCODONE HYDROCHLORIDE 5 MG/1
1 TABLET ORAL
Qty: 60 | Refills: 0
Start: 2019-06-04

## 2019-06-04 RX ADMIN — Medication 650 MILLIGRAM(S): at 17:04

## 2019-06-04 RX ADMIN — OXYCODONE HYDROCHLORIDE 10 MILLIGRAM(S): 5 TABLET ORAL at 01:25

## 2019-06-04 RX ADMIN — Medication 100 MICROGRAM(S): at 05:08

## 2019-06-04 RX ADMIN — Medication 1000 MILLIGRAM(S): at 05:38

## 2019-06-04 RX ADMIN — OXYCODONE HYDROCHLORIDE 10 MILLIGRAM(S): 5 TABLET ORAL at 00:55

## 2019-06-04 RX ADMIN — SODIUM CHLORIDE 1000 MILLILITER(S): 9 INJECTION INTRAMUSCULAR; INTRAVENOUS; SUBCUTANEOUS at 06:32

## 2019-06-04 RX ADMIN — OXYCODONE HYDROCHLORIDE 10 MILLIGRAM(S): 5 TABLET ORAL at 05:08

## 2019-06-04 RX ADMIN — TRAMADOL HYDROCHLORIDE 50 MILLIGRAM(S): 50 TABLET ORAL at 21:37

## 2019-06-04 RX ADMIN — Medication 325 MILLIGRAM(S): at 05:08

## 2019-06-04 RX ADMIN — Medication 30 MILLIGRAM(S): at 13:48

## 2019-06-04 RX ADMIN — Medication 30 MILLIGRAM(S): at 13:15

## 2019-06-04 RX ADMIN — Medication 30 MILLIGRAM(S): at 05:38

## 2019-06-04 RX ADMIN — OXYCODONE HYDROCHLORIDE 10 MILLIGRAM(S): 5 TABLET ORAL at 05:38

## 2019-06-04 RX ADMIN — TRAMADOL HYDROCHLORIDE 50 MILLIGRAM(S): 50 TABLET ORAL at 22:07

## 2019-06-04 RX ADMIN — Medication 30 MILLIGRAM(S): at 22:07

## 2019-06-04 RX ADMIN — Medication 100 MILLIGRAM(S): at 21:37

## 2019-06-04 RX ADMIN — Medication 400 MILLIGRAM(S): at 05:09

## 2019-06-04 RX ADMIN — Medication 100 MILLIGRAM(S): at 05:08

## 2019-06-04 RX ADMIN — SIMVASTATIN 40 MILLIGRAM(S): 20 TABLET, FILM COATED ORAL at 21:37

## 2019-06-04 RX ADMIN — Medication 30 MILLIGRAM(S): at 21:37

## 2019-06-04 RX ADMIN — Medication 325 MILLIGRAM(S): at 17:04

## 2019-06-04 RX ADMIN — Medication 30 MILLIGRAM(S): at 05:08

## 2019-06-04 RX ADMIN — PANTOPRAZOLE SODIUM 40 MILLIGRAM(S): 20 TABLET, DELAYED RELEASE ORAL at 05:08

## 2019-06-04 RX ADMIN — ONDANSETRON 4 MILLIGRAM(S): 8 TABLET, FILM COATED ORAL at 09:19

## 2019-06-04 NOTE — OCCUPATIONAL THERAPY INITIAL EVALUATION ADULT - LEVEL OF INDEPENDENCE: SIT/STAND, REHAB EVAL
supervision No Repair - Repaired With Adjacent Surgical Defect Text (Leave Blank If You Do Not Want): After obtaining clear surgical margins the defect was repaired concurrently with another surgical defect which was in close approximation.

## 2019-06-04 NOTE — DISCHARGE NOTE PROVIDER - NSDCACTIVITY_GEN_ALL_CORE
Stairs allowed/No heavy lifting/straining/Walking - Outdoors allowed/Walking - Indoors allowed/Driving allowed/Do not drive or operate machinery

## 2019-06-04 NOTE — DISCHARGE NOTE PROVIDER - CARE PROVIDER_API CALL
Missy Anthony)  Orthopaedic Surgery  [address], [address]  [place] [address]  Phone: [phone]  Fax: [phone]  Follow Up Time:

## 2019-06-04 NOTE — DISCHARGE NOTE PROVIDER - HOSPITAL COURSE
History of Present Illness        70 yo female, PMH HTN, HLD, OA of left hip with pain for many years, preventing her to stay active, presently using cane to ambulate. Pt. presents to [facility] today for scheduled Left anterior Total Hip Replacement on 6/3/19. Pt. denies recent fever, chills, chest pain, SOB, changes in bowel/urinary habits.    CBC, T&S, MRSA/MSSA done at [facility], BMP done at PCP - wnl, HgA1c 5.9 on 5/4/19        Allergies/Medications:     Allergies:          Allergies:    	latex: Latex, Hives    	penicillin: Drug, Hives, Urticaria        Home Medications:     * Patient Currently Takes Medications as of 13-May-2019 14:22 documented in Structured Notes    · 	simvastatin 40 mg oral tablet: Last Dose Taken:  , 1 tab(s) orally once a day (at bedtime)    · 	olmesartan-hydrochlorothiazide 20 mg-12.5 mg oral tablet: Last Dose Taken:  , 1 tab(s) orally once a day (at bedtime)    · 	levothyroxine 100 mcg (0.1 mg) oral tablet: Last Dose Taken:  , 1 tab(s) orally once a day    · 	meloxicam 15 mg oral tablet: Last Dose Taken:  , 1 tab(s) orally once a day, As Needed    · 	Tylenol 500 mg oral tablet: Last Dose Taken:  , 2 tab(s) orally once a day, As Needed        PMH/PSH/FH/SH:      Past Medical, Past Surgical, and Family History:    PAST MEDICAL HISTORY:    Arthritis     Hallux Rigidus     HLD (Hyperlipidemia)     HTN - Hypertension     Hypothyroidism.         PAST SURGICAL HISTORY:    Hysterectomy 1999    S/P bunionectomy 2015    Thyroidectomy secondary to thyroid nodules, 1/2010.        6/3/19- Patient presents to the hospital for elective surgery, underwent a left total hip replacement- tolerated procedure without complications.    Patient was evaluated by Physical and Occupational therapy whom recommended home for discharge plan.    Patient is stable for discharge when cleared by PT.

## 2019-06-04 NOTE — PROGRESS NOTE ADULT - SUBJECTIVE AND OBJECTIVE BOX
Ortho Progress Note    Patient seen and examined.  No acute events overnight.  Pain well controlled.     Vital Signs Last 24 Hrs  T(C): 36.9 (04 Jun 2019 04:49), Max: 36.9 (04 Jun 2019 00:50)  T(F): 98.5 (04 Jun 2019 04:49), Max: 98.5 (04 Jun 2019 04:49)  HR: 78 (04 Jun 2019 04:49) (61 - 96)  BP: 104/65 (04 Jun 2019 04:49) (92/58 - 124/75)  BP(mean): 89 (03 Jun 2019 17:00) (70 - 93)  RR: 18 (04 Jun 2019 04:49) (14 - 18)  SpO2: 97% (04 Jun 2019 04:49) (96% - 100%)    06-03 @ 07:01  -  06-04 @ 06:48  --------------------------------------------------------  IN: 1435 mL / OUT: 600 mL / NET: 835 mL        Exam:  Gen: NAD  LLE:  Dressing c/d/i  Motor: 5/5 EHL/FHL/TA/Gastrocnemius  Sensory: SILT DP/SP/S/S/T nerve distributions  Vascular: 2+ Dorsalis Pedis pulse        Assessment/Plan:  This is a 69yFemale admitted for L THR.    -pain control  -PT  -WBAT  -OOB  -DVT ppx  -dispo plan

## 2019-06-04 NOTE — DISCHARGE NOTE PROVIDER - NSDCFUADDAPPT_GEN_ALL_CORE_FT
Please call Dr. Anthony's office within next few days to schedule a follow up appointment about 14 days after surgery.

## 2019-06-04 NOTE — OCCUPATIONAL THERAPY INITIAL EVALUATION ADULT - DIAGNOSIS, OT EVAL
Pt presents with pain, anterior hip precautions, decreased ROM, strength, endurance, balance, and coordination, all impacting ability to perform ADLs and functional mobility.

## 2019-06-04 NOTE — DISCHARGE NOTE PROVIDER - NSDCFUADDINST_GEN_ALL_CORE_FT
Dressing will be changed during office visit. Out of bed, ambulate, weight bearing as tolerated- Physical therapy to assist with exercise and help increase endurance.  Patient may shower, limit direct water to dressing, if wet pat dry.

## 2019-06-04 NOTE — OCCUPATIONAL THERAPY INITIAL EVALUATION ADULT - SITTING BALANCE: STATIC
` `     [place] [address] MEDICAL SURGICAL: [phone]            Medication Administration Report for [place] as of 02/18/18 1215   Legend:    Given Hold Not Given Due Canceled Entry Other Actions    Time Time (Time) Time  Time-Action       Inactive    Active    Linked        Medications 02/12/18 02/13/18 02/14/18 02/15/18 02/16/18 02/17/18 02/18/18    acetaminophen (TYLENOL) tablet 650 mg  Dose: 650 mg  Freq: 2 TIMES DAILY Route: PO  Start: 02/13/18 0915   Admin Instructions: Maximum acetaminophen dose from all sources = 75 mg/kg/day not to exceed 4 grams/day.      (1002)-Not Given [C]       1010 (650 mg)-Given       2110 (650 mg)-Given        0852 (650 mg)-Given       2104 (650 mg)-Given        0913 (650 mg)-Given       2022 (650 mg)-Given        0907 (650 mg)-Given       2213 (650 mg)-Given        0801 (650 mg)-Given       2027 (650 mg)-Given        0804 (650 mg)-Given       [ ] 2100           acetaminophen (TYLENOL) tablet 650 mg  Dose: 650 mg  Freq: EVERY 4 HOURS PRN Route: PO  PRN Comment: fever ,pain  Start: 02/11/18 1000   Admin Instructions: Maximum acetaminophen dose from all sources = 75 mg/kg/day not to exceed 4 grams/day.               divalproex sodium delayed-release (DEPAKOTE) DR tablet 125 mg  Dose: 125 mg  Freq: AT BEDTIME Route: PO  Start: 02/16/18 2200        2213 (125 mg)-Given        2125 (125 mg)-Given        [ ] 2200           divalproex sodium delayed-release (DEPAKOTE) DR tablet 250 mg  Dose: 250 mg  Freq: 2 TIMES DAILY Route: PO  Start: 02/16/18 1215        1218 (250 mg)-Given       1723 (250 mg)-Given        0801 (250 mg)-Given       1749 (250 mg)-Given        0804 (250 mg)-Given       [ ] 1800           haloperidol lactate (HALDOL) injection 2 mg  Dose: 2 mg  Freq: EVERY 6 HOURS PRN Route: IV  PRN Reason: agitation  Start: 02/12/18 1105   Admin Instructions: For ordered doses up to 5 mg, give IV Push undiluted over 1 minute.     2055 (2 mg)-Given        0200 (2  mg)-Given       1356 (2 mg)-Given       2134 (2 mg)-Given        0710 (2 mg)-Given       1400 (2 mg)-Given       2004 (2 mg)-Given        0243 (2 mg)-Given        1835 (2 mg)-Given             heparin sodium PF injection 5,000 Units  Dose: 5,000 Units  Freq: EVERY 12 HOURS Route: SC  Start: 02/11/18 0800   Admin Instructions: HOLD heparin IF platelet count falls below 50% baseline or less than 100,000 /  L and notify provider. Use this product If CrCl less than 30 mL/min.  High concentration heparin. Not for line flush or cath care.     0944 (5,000 Units)-Given       2055 (5,000 Units)-Given        1010 (5,000 Units)-Given       1938 (5,000 Units)-Given        0710 (5,000 Units)-Given       2003 (5,000 Units)-Given        0913 (5,000 Units)-Given       1915 (5,000 Units)-Given        0907 (5,000 Units)-Given       2214 (5,000 Units)-Given        0802 (5,000 Units)-Given       2026 (5,000 Units)-Given        0804 (5,000 Units)-Given       [ ] 2000           influenza Vac Split High-Dose (FLUZONE) injection 0.5 mL  Dose: 0.5 mL  Freq: PRIOR TO DISCHARGE Route: IM  Start: 02/13/18 1000   Admin Instructions: Administer when afebrile (less than 100.4F) x 24 hours, or Prior to Discharge.  If not administering when scheduled , change the due time by following the instructions in the reference link below.  If patient refuses vaccine, chart as Vaccine Refused.       0909-Hold [C]        (2221)-Vaccine Refused [C]              ipratropium - albuterol 0.5 mg/2.5 mg/3 mL (DUONEB) neb solution 3 mL  Dose: 3 mL  Freq: EVERY 4 HOURS PRN Route: NEBULIZATION  PRN Reason: wheezing  Start: 02/11/18 1418              levofloxacin (LEVAQUIN) infusion 500 mg  Dose: 500 mg  Freq: EVERY 24 HOURS Route: IV  Indications of Use: COMMUNITY ACQUIRED PNEUMONIA  Last Dose: 500 mg (02/17/18 1250)  Start: 02/12/18 1200   Admin Instructions: Irritant. Administer at a rate of no greater than 100 mL/hr     1211 (500 mg)-New Bag        1256 (500  mg)-New Bag        1136 (500 mg)-New Bag        1322 (500 mg)-New Bag        1211 (500 mg)-New Bag        1250 (500 mg)-New Bag        [ ] 1200           lidocaine 1 % 0.5-5 mL  Dose: 0.5-5 mL  Freq: ONCE PRN Route: OTHER  PRN Comment: mild pain For local anesthetic during PICC insertion.  Start: 02/13/18 0911   Admin Instructions: Give Sub-Q/Intradermal.  Give in divided doses as needed.               loperamide (IMODIUM) capsule 2 mg  Dose: 2 mg  Freq: 4 TIMES DAILY PRN Route: PO  PRN Reason: diarrhea  Start: 02/11/18 1001              melatonin tablet 1 mg  Dose: 1 mg  Freq: AT BEDTIME PRN Route: PO  PRN Reason: sleep  Start: 02/11/18 0529   Admin Instructions: Do not give unless at least 6 hours of uninterrupted sleep is expected.       2346 (1 mg)-Given        (2037)-Not Given        2213 (1 mg)-Given        2027 (1 mg)-Given            naloxone (NARCAN) injection 0.1-0.4 mg  Dose: 0.1-0.4 mg  Freq: EVERY 2 MIN PRN Route: IV  PRN Reason: opioid reversal  Start: 02/11/18 0529   Admin Instructions: For respiratory rate LESS than or EQUAL to 8.  Partial reversal dose:  0.1 mg titrated q 2 minutes for Analgesia Side Effects Monitoring Sedation Level of 3 (frequently drowsy, arousable, drifts to sleep during conversation).Full reversal dose:  0.4 mg bolus for Analgesia Side Effects Monitoring Sedation Level of 4 (somnolent, minimal or no response to stimulation).  For ordered doses up to 2mg give IVP. Give each 0.4mg over 15 seconds in emergency situations. For non-emergent situations further dilute in 9mL of NS to facilitate titration of response.               ondansetron (ZOFRAN-ODT) ODT tab 4 mg  Dose: 4 mg  Freq: EVERY 6 HOURS PRN Route: PO  PRN Reasons: nausea,vomiting  Start: 02/11/18 0529   Admin Instructions: This is Step 1 of nausea and vomiting management.  If nausea not resolved in 15 minutes, go to Step 2 prochlorperazine (COMPAZINE). Do not push through foil backing. Peel back foil and gently remove.  Place on tongue immediately. Administration with liquid unnecessary  With dry hands, peel back foil backing and gently remove tablet; do not push oral disintegrating tablet through foil backing; administer immediately on tongue and oral disintegrating tablet dissolves in seconds; then swallow with saliva; liquid not required.              Or  ondansetron (ZOFRAN) injection 4 mg  Dose: 4 mg  Freq: EVERY 6 HOURS PRN Route: IV  PRN Reasons: nausea,vomiting  Start: 02/11/18 0529   Admin Instructions: This is Step 1 of nausea and vomiting management.  If nausea not resolved in 15 minutes, go to Step 2 prochlorperazine (COMPAZINE).  Irritant. For ordered doses up to 4 mg, give IV Push undiluted over 2-5 minutes.               polyethylene glycol (MIRALAX/GLYCOLAX) Packet 17 g  Dose: 17 g  Freq: DAILY Route: PO  Start: 02/13/18 0915   Admin Instructions: 1 Packet = 17 grams. Mixed prescribed dose in 8 ounces of water. Follow with 8 oz. of water.      (1011)-Not Given        0852 (17 g)-Given        0914 (17 g)-Given        0907 (17 g)-Given        0802 (17 g)-Given        (0806)-Not Given           potassium chloride (KLOR-CON) Packet 20 mEq  Dose: 20 mEq  Freq: 2 TIMES DAILY Route: PO  Start: 02/17/18 0800   Admin Instructions: Dissolve packet contents in 4-8 ounces of cold water or juice.          0500 (20 mEq)-Given       0804 (20 mEq)-Given        0805 (20 mEq)-Given       [ ] 2100           potassium chloride (KLOR-CON) Packet 20-40 mEq  Dose: 20-40 mEq  Freq: EVERY 2 HOURS PRN Route: ORAL OR FEED  PRN Reason: potassium supplementation  Start: 02/14/18 0823   Admin Instructions: Use if unable to tolerate tablets.  If Serum K+ 3.0-3.3, dose = 60 mEq po total dose (40 mEq x1 followed in 2 hours by 20 mEq x1). Recheck K+ level 4 hours after dose and the next AM.  If Serum K+ 2.5-2.9, dose = 80 mEq po total dose (40 mEq Q2H x2). Recheck K+ level 4 hours after dose and the next AM.  If Serum K+ less than 2.5, See IV  order.  Dissolve packet contents in 4-8 ounces of cold water or juice.       0908 (40 mEq)-Given       1138 (40 mEq)-Given         0907 (40 mEq)-Given       1140 (20 mEq)-Given             potassium chloride 10 mEq in 100 mL intermittent infusion with 10 mg lidocaine  Dose: 10 mEq  Freq: EVERY 1 HOUR PRN Route: IV  PRN Reason: potassium supplementation  Start: 02/14/18 0823   Admin Instructions: Infuse via PERIPHERAL LINE. Use potassium with lidocaine for pain with peripheral administration.  If Serum K+ 3.0-3.3, dose = 10 mEq/hr x4 doses (40 mEq IV total dose). Recheck K+ level 2 hours after dose and the next AM.  If Serum K+ less than 3.0, dose = 10 mEq/hr x6 doses (60 mEq IV total dose). Recheck K+ level 2 hours after dose and the next AM.        1046 (10 mEq)-New Bag [C]       1421 (10 mEq)-New Bag       1602 (10 mEq)-New Bag       1706 (10 mEq)-New Bag              potassium chloride 10 mEq in 100 mL sterile water intermittent infusion (premix)  Dose: 10 mEq  Freq: EVERY 1 HOUR PRN Route: IV  PRN Reason: potassium supplementation  Start: 02/14/18 0823   Admin Instructions: Infuse via PERIPHERAL LINE or CENTRAL LINE. Use for central line replacement if patient weight less than 65 kg, if patient is on TPN with high potassium content or if unit does not stock 20 mEq bags.   If Serum K+ 3.0-3.3, dose = 10 mEq/hr x4 doses (40 mEq IV total dose). Recheck K+ level 2 hours after dose and the next AM.   If Serum K+ less than 3.0, dose = 10 mEq/hr x6 doses (60 mEq IV total dose). Recheck K+ level 2 hours after dose and the next AM.               potassium chloride 20 mEq in 50 mL intermittent infusion  Dose: 20 mEq  Freq: EVERY 1 HOUR PRN Route: IV  PRN Reason: potassium supplementation  Start: 02/14/18 0823   Admin Instructions: Infuse via CENTRAL LINE Only. May need EKG if less than 65 kg or on TPN - Max rate is 0.3 mEq/kg/hr for patients not on EKG monitoring.   If Serum K+ 3.0-3.3, dose = 20 mEq/hr x2 doses (40 mEq  IV total dose). Recheck K+ level 2 hours after dose and the next AM.  If Serum K+ less than 3.0, dose = 20 mEq/hr x3 doses (60 mEq IV total dose). Recheck K+ level 2 hours after dose and the next AM.               potassium chloride SA (K-DUR/KLOR-CON M) CR tablet 20-40 mEq  Dose: 20-40 mEq  Freq: EVERY 2 HOURS PRN Route: PO  PRN Reason: potassium supplementation  Start: 02/14/18 0823   Admin Instructions: Use if able to take PO.   If Serum K+ 3.0-3.3, dose = 60 mEq po total dose (40 mEq x1 followed in 2 hours by 20 mEq x1). Recheck K+ level 4 hours after dose and the next AM.  If Serum K+ 2.5-2.9, dose = 80 mEq po total dose (40 mEq Q2H x2). Recheck K+ level 4 hours after dose and the next AM.  If Serum K+ less than 2.5, See IV order.  DO NOT CRUSH               QUEtiapine (SEROquel) tablet 50 mg  Dose: 50 mg  Freq: 2 TIMES DAILY Route: PO  Start: 02/13/18 0915   Admin Instructions: Hold if sedated      1010 (50 mg)-Given       2109 (50 mg)-Given        0709 (50 mg)-Given       (0853)-Not Given [C]       2104 (50 mg)-Given        0913 (50 mg)-Given       2022 (50 mg)-Given        0907 (50 mg)-Given       2213 (50 mg)-Given        0801 (50 mg)-Given       2027 (50 mg)-Given        0804 (50 mg)-Given       [ ] 2100           sodium chloride (PF) 0.9% PF flush 10 mL  Dose: 10 mL  Freq: EVERY 8 HOURS Route: IK  Start: 02/13/18 1145   Admin Instructions: And Q1H PRN, to lock peripheral midline IV catheter dormant lumen. Recommended to flush Q8H each lumen even during infusions      1257 (10 mL)-Given       (1938)-Not Given        (0000)-Not Given       (0855)-Not Given [C]       0908 (10 mL)-Given       (1635)-Not Given       2346 (10 mL)-Given        (0914)-Not Given       (1514)-Not Given        (0033)-Not Given       (0908)-Not Given       (1629)-Not Given        (0409)-Not Given       0807 (10 mL)-Given       1540 (10 mL)-Given        0208 (10 mL)-Given       0806 (10 mL)-Given       [ ] 1600           sodium  chloride (PF) 0.9% PF flush 10-20 mL  Dose: 10-20 mL  Freq: EVERY 1 HOUR PRN Route: IK  PRN Reasons: line flush,post meds or blood draw  PRN Comment: to flush peripheral midline IV catheter  Start: 02/13/18 1143   Admin Instructions: 10 mL post IV meds;  20 mL post blood draw       1400 (10 mL)-Given               sodium chloride (PF) 0.9% PF flush 5-50 mL  Dose: 5-50 mL  Freq: ONCE PRN Route: IK  PRN Reason: line flush  PRN Comment: to flush each lumen with line placement  Start: 02/13/18 0911   Admin Instructions: May repeat x 1               traZODone (DESYREL) tablet 50 mg  Dose: 50 mg  Freq: AT BEDTIME PRN Route: PO  PRN Reason: sleep  Start: 02/11/18 1001      2346 (50 mg)-Given        2022 (50 mg)-Given        2213 (50 mg)-Given        2028 (50 mg)-Given           Discontinued Medications  Medications 02/12/18 02/13/18 02/14/18 02/15/18 02/16/18 02/17/18 02/18/18         Rate: 75 mL/hr   Freq: CONTINUOUS Route: IV  Start: 02/16/18 0800   End: 02/16/18 0835               0835-Med Discontinued           Dose: 1,000 mg  Freq: EVERY 6 HOURS PRN Route: IV  PRN Reasons: mild pain,fever  Last Dose: 1,000 mg (02/13/18 0312)  Start: 02/12/18 1950   End: 02/16/18 1605   Admin Instructions: Maximum acetaminophen dose from all sources = 75 mg/kg/day not to exceed 4 grams/day.     2055 (1,000 mg)-New Bag        0312 (1,000 mg)-New Bag          1605-Med Discontinued           Rate: 100 mL/hr   Freq: CONTINUOUS Route: IV  Start: 02/13/18 1930   End: 02/16/18 0754     1936 ( )-New Bag       1937 ( )-New Bag        0222 ( )-Rate/Dose Verify       0514 ( )-New Bag       0539 ( )-Rate/Dose Verify       1607 ( )-New Bag       2332-Stopped [C]       2349 ( )-New Bag [C]        0601 ( )-Rate/Dose Verify       1043 ( )-New Bag       1531 ( )-Rate/Dose Verify        0238 ( )-New Bag       0754-Med Discontinued           Rate: 75 mL/hr   Freq: CONTINUOUS Route: IV  Start: 02/16/18 0845   End: 02/17/18 0746        0959 ( )-New Bag        1630 ( )-Rate/Dose Verify       2159 ( )-New Bag        0746-Med Discontinued           normal balance

## 2019-06-04 NOTE — DISCHARGE NOTE PROVIDER - NSDCCPCAREPLAN_GEN_ALL_CORE_FT
PRINCIPAL DISCHARGE DIAGNOSIS  Diagnosis: OA (osteoarthritis)  Assessment and Plan of Treatment: Left hip

## 2019-06-05 ENCOUNTER — TRANSCRIPTION ENCOUNTER (OUTPATIENT)
Age: 69
End: 2019-06-05

## 2019-06-05 VITALS
SYSTOLIC BLOOD PRESSURE: 100 MMHG | RESPIRATION RATE: 18 BRPM | DIASTOLIC BLOOD PRESSURE: 59 MMHG | OXYGEN SATURATION: 95 % | TEMPERATURE: 98 F | HEART RATE: 74 BPM

## 2019-06-05 DIAGNOSIS — Z96.642 PRESENCE OF LEFT ARTIFICIAL HIP JOINT: ICD-10-CM

## 2019-06-05 PROCEDURE — 86870 RBC ANTIBODY IDENTIFICATION: CPT

## 2019-06-05 PROCEDURE — 86922 COMPATIBILITY TEST ANTIGLOB: CPT

## 2019-06-05 PROCEDURE — 86850 RBC ANTIBODY SCREEN: CPT

## 2019-06-05 PROCEDURE — 85027 COMPLETE CBC AUTOMATED: CPT

## 2019-06-05 PROCEDURE — 97161 PT EVAL LOW COMPLEX 20 MIN: CPT

## 2019-06-05 PROCEDURE — 80048 BASIC METABOLIC PNL TOTAL CA: CPT

## 2019-06-05 PROCEDURE — 97116 GAIT TRAINING THERAPY: CPT

## 2019-06-05 PROCEDURE — 97165 OT EVAL LOW COMPLEX 30 MIN: CPT

## 2019-06-05 PROCEDURE — 86901 BLOOD TYPING SEROLOGIC RH(D): CPT

## 2019-06-05 PROCEDURE — 76000 FLUOROSCOPY <1 HR PHYS/QHP: CPT

## 2019-06-05 PROCEDURE — 82962 GLUCOSE BLOOD TEST: CPT

## 2019-06-05 PROCEDURE — 97535 SELF CARE MNGMENT TRAINING: CPT

## 2019-06-05 PROCEDURE — C1776: CPT

## 2019-06-05 PROCEDURE — 86900 BLOOD TYPING SEROLOGIC ABO: CPT

## 2019-06-05 PROCEDURE — 72170 X-RAY EXAM OF PELVIS: CPT

## 2019-06-05 PROCEDURE — 97530 THERAPEUTIC ACTIVITIES: CPT

## 2019-06-05 RX ADMIN — Medication 100 MILLIGRAM(S): at 05:34

## 2019-06-05 RX ADMIN — Medication 100 MICROGRAM(S): at 05:34

## 2019-06-05 RX ADMIN — CELECOXIB 200 MILLIGRAM(S): 200 CAPSULE ORAL at 06:04

## 2019-06-05 RX ADMIN — Medication 650 MILLIGRAM(S): at 06:04

## 2019-06-05 RX ADMIN — Medication 30 MILLIGRAM(S): at 06:04

## 2019-06-05 RX ADMIN — Medication 650 MILLIGRAM(S): at 05:34

## 2019-06-05 RX ADMIN — Medication 325 MILLIGRAM(S): at 05:35

## 2019-06-05 RX ADMIN — Medication 30 MILLIGRAM(S): at 05:34

## 2019-06-05 RX ADMIN — CELECOXIB 200 MILLIGRAM(S): 200 CAPSULE ORAL at 05:34

## 2019-06-05 RX ADMIN — PANTOPRAZOLE SODIUM 40 MILLIGRAM(S): 20 TABLET, DELAYED RELEASE ORAL at 05:34

## 2019-06-05 NOTE — PROGRESS NOTE ADULT - PROBLEM SELECTOR PLAN 1
- WBAT   - D/C home total - PT-WBAT   - IS  -DVT ppx continue ecotrin 325 BID  -Analgesia  -Home today.    I have examined the patient, reviewed the PA student's note and changed where necessary.  I agree with the above note.    RAMÓN Glover PA-C  #[id number] - PT-WBAT   - IS  -DVT ppx continue ecotrin 325 BID  -Analgesia  -Home today.    BONITA Ritter-S    I have examined the patient, reviewed the PA student's note and changed where necessary.  I agree with the above note.      RAMÓN Glover PA-C  #[id number]

## 2019-06-05 NOTE — DISCHARGE NOTE NURSING/CASE MANAGEMENT/SOCIAL WORK - NSDCDPATPORTLINK_GEN_ALL_CORE
You can access the [facility] Patient Portal, offered by [facility], by registering with the following website: [email or web address]

## 2019-06-05 NOTE — PROGRESS NOTE ADULT - ASSESSMENT
70 y/o F s/p left total hip replacement POD#2 doing well and ready to go home today. 70 y/o F s/p left total hip replacement POD#2 doing well and ready to go home today.  No n/v.

## 2019-06-05 NOTE — PROGRESS NOTE ADULT - SUBJECTIVE AND OBJECTIVE BOX
68 y/o F seen and examined with BONITA Glover s/p left total hip replacement POD#2. Patient states she is feeling well, denies N/V and is ready to go home.     Vital Signs Last 24 Hrs  T(C): 36.8 (05 Jun 2019 04:44), Max: 37.1 (04 Jun 2019 15:37)  T(F): 98.3 (05 Jun 2019 04:44), Max: 98.8 (04 Jun 2019 15:37)  HR: 73 (05 Jun 2019 04:44) (70 - 82)  BP: 106/62 (05 Jun 2019 04:44) (99/66 - 117/27)  BP(mean): --  RR: 18 (05 Jun 2019 04:44) (18 - 18)  SpO2: 96% (05 Jun 2019 04:44) (95% - 98%)    PE  Gen: Alert and oriented   Extremity: LE aquacel C/D/I  b/l compartments soft and nontender, calves s/nt  2+ DP on LLE. +PF and DF, EHL and FHL intact 68 y/o F seen and examined with BONITA Glover s/p left total hip replacement POD#2. Patient states she is feeling well, denies N/V and is ready to go home.     Vital Signs Last 24 Hrs  T(C): 36.8 (05 Jun 2019 04:44), Max: 37.1 (04 Jun 2019 15:37)  T(F): 98.3 (05 Jun 2019 04:44), Max: 98.8 (04 Jun 2019 15:37)  HR: 73 (05 Jun 2019 04:44) (70 - 82)  BP: 106/62 (05 Jun 2019 04:44) (99/66 - 117/27)  BP(mean): --  RR: 18 (05 Jun 2019 04:44) (18 - 18)  SpO2: 96% (05 Jun 2019 04:44) (95% - 98%)    PE  Gen: Alert and oriented   Extremity: LE aquacel C/D/I  b/l compartments soft and nontender, calves s/nt  LLE: 2+ DP on LLE. +PF and DF, EHL and FHL intact.  SILT.

## 2019-06-14 ENCOUNTER — APPOINTMENT (OUTPATIENT)
Dept: ORTHOPEDIC SURGERY | Facility: CLINIC | Age: 69
End: 2019-06-14
Payer: MEDICARE

## 2019-06-14 VITALS — BODY MASS INDEX: 29.92 KG/M2 | HEIGHT: 70 IN | WEIGHT: 209 LBS

## 2019-06-14 PROCEDURE — 73502 X-RAY EXAM HIP UNI 2-3 VIEWS: CPT | Mod: LT

## 2019-06-14 PROCEDURE — 99024 POSTOP FOLLOW-UP VISIT: CPT

## 2019-06-20 ENCOUNTER — OTHER (OUTPATIENT)
Age: 69
End: 2019-06-20

## 2019-09-01 ENCOUNTER — FORM ENCOUNTER (OUTPATIENT)
Age: 69
End: 2019-09-01

## 2019-09-03 ENCOUNTER — APPOINTMENT (OUTPATIENT)
Dept: ORTHOPEDIC SURGERY | Facility: CLINIC | Age: 69
End: 2019-09-03
Payer: MEDICARE

## 2019-09-03 DIAGNOSIS — Z96.642 PRESENCE OF LEFT ARTIFICIAL HIP JOINT: ICD-10-CM

## 2019-09-03 PROCEDURE — 99213 OFFICE O/P EST LOW 20 MIN: CPT

## 2019-09-03 PROCEDURE — 73502 X-RAY EXAM HIP UNI 2-3 VIEWS: CPT | Mod: LT

## 2021-09-23 ENCOUNTER — TRANSCRIPTION ENCOUNTER (OUTPATIENT)
Age: 71
End: 2021-09-23

## 2021-11-12 ENCOUNTER — TRANSCRIPTION ENCOUNTER (OUTPATIENT)
Age: 71
End: 2021-11-12

## 2021-11-17 ENCOUNTER — TRANSCRIPTION ENCOUNTER (OUTPATIENT)
Age: 71
End: 2021-11-17

## 2021-12-15 NOTE — H&P PST ADULT - RESPIRATORY AND THORAX
details… Birth Control Pills Counseling: Birth Control Pill Counseling: I discussed with the patient the potential side effects of OCPs including but not limited to increased risk of stroke, heart attack, thrombophlebitis, deep venous thrombosis, hepatic adenomas, breast changes, GI upset, headaches, and depression.  The patient verbalized understanding of the proper use and possible adverse effects of OCPs. All of the patient's questions and concerns were addressed.

## 2022-01-24 ENCOUNTER — TRANSCRIPTION ENCOUNTER (OUTPATIENT)
Age: 72
End: 2022-01-24

## 2022-06-18 ENCOUNTER — NON-APPOINTMENT (OUTPATIENT)
Age: 72
End: 2022-06-18

## 2022-06-18 NOTE — PHYSICAL THERAPY INITIAL EVALUATION ADULT - ADDITIONAL COMMENTS
Pt states she lives alone in an apartment with no steps to enter and elevator access. Pt states prior to admission being independent with all functional mobility and ADLs. Pt states she was driving, volunteering at [employer], and ambulating with a straight cane at times prior to admission. Xeltanviz Pregnancy And Lactation Text: This medication is Pregnancy Category D and is not considered safe during pregnancy.  The risk during breast feeding is also uncertain.

## 2022-09-19 ENCOUNTER — NON-APPOINTMENT (OUTPATIENT)
Age: 72
End: 2022-09-19

## 2022-09-28 ENCOUNTER — NON-APPOINTMENT (OUTPATIENT)
Age: 72
End: 2022-09-28

## 2022-12-27 NOTE — PRE-OP CHECKLIST - SELECT TESTS ORDERED
PCP: Mildred Lanier MD    Last appt: 9/15/2022  Future Appointments   Date Time Provider Mian Cantrell   3/23/2023 10:00 AM Bina Estrella MD [facility] AMB       Requested Prescriptions     Pending Prescriptions Disp Refills    metoprolol succinate (TOPROL-XL) 25 mg XL tablet 90 Tablet 2     Sig: Take 1 Tablet by mouth daily. Type and Screen

## 2023-11-27 NOTE — OCCUPATIONAL THERAPY INITIAL EVALUATION ADULT - PERTINENT HX OF CURRENT PROBLEM, REHAB EVAL
68 yo female, PMH HTN, HLD, OA of left hip with pain for many years, preventing her to stay active, presently using cane to ambulate. S/p Left anterior Total Hip Replacement 6/3/19. normal... Well appearing, awake, alert, oriented to person, place, time/situation and in no apparent distress.

## 2024-07-16 ENCOUNTER — NON-APPOINTMENT (OUTPATIENT)
Age: 74
End: 2024-07-16

## 2024-07-16 ENCOUNTER — APPOINTMENT (OUTPATIENT)
Dept: ORTHOPEDIC SURGERY | Facility: CLINIC | Age: 74
End: 2024-07-16

## 2024-07-16 VITALS — WEIGHT: 185 LBS | BODY MASS INDEX: 26.48 KG/M2 | HEIGHT: 70 IN

## 2024-07-16 DIAGNOSIS — M19.042 PRIMARY OSTEOARTHRITIS, LEFT HAND: ICD-10-CM

## 2024-07-16 PROCEDURE — 99204 OFFICE O/P NEW MOD 45 MIN: CPT

## 2024-07-16 PROCEDURE — 73140 X-RAY EXAM OF FINGER(S): CPT | Mod: 26,FA

## 2024-07-16 RX ORDER — DICLOFENAC SODIUM 1% 10 MG/G
1 GEL TOPICAL 4 TIMES DAILY
Qty: 1 | Refills: 5 | Status: ACTIVE | COMMUNITY
Start: 2024-07-16 | End: 1900-01-01

## 2024-07-16 RX ORDER — MELOXICAM 15 MG/1
15 TABLET ORAL DAILY
Qty: 30 | Refills: 6 | Status: ACTIVE | COMMUNITY
Start: 2024-07-16 | End: 1900-01-01

## 2024-08-11 NOTE — H&P PST ADULT - NEGATIVE CARDIOVASCULAR SYMPTOMS
no chest pain/no dyspnea on exertion/no palpitations/no peripheral edema/no orthopnea/no paroxysmal nocturnal dyspnea/no claudication This was a shared visit with the MIT[facility]. I reviewed and verified the documentation. no palpitations/no chest pain/no dyspnea on exertion/no peripheral edema

## 2024-09-24 NOTE — PRE-OP CHECKLIST - BSA (M2)
- BP of 120/59 in office today  - Currently on amlodipine 10 mg QD, lisinopril 10 mg QD  - Stable    Plan  - Continue to monitor   - Advised patient to keep BP log for review   - Continue current medications         2.14

## 2024-12-16 NOTE — PATIENT PROFILE ADULT - NSPROGENDIFFINTUB_GEN_A_NUR
[facility]  [facility]  [facility]    For podiatry evaluation     Do home exercises   For back    Knee xray today     Call  to make appt for orthopedics     Pneumonia vaccine today     Fasting labs today      Call  schedule your mammogram and bone density   March            previously intubated - no problems